# Patient Record
Sex: FEMALE | Race: WHITE
[De-identification: names, ages, dates, MRNs, and addresses within clinical notes are randomized per-mention and may not be internally consistent; named-entity substitution may affect disease eponyms.]

---

## 2017-08-14 ENCOUNTER — HOSPITAL ENCOUNTER (OUTPATIENT)
Dept: HOSPITAL 47 - RADMRIMAIN | Age: 52
Discharge: HOME | End: 2017-08-14
Payer: COMMERCIAL

## 2017-08-14 DIAGNOSIS — M47.22: ICD-10-CM

## 2017-08-14 DIAGNOSIS — M51.27: Primary | ICD-10-CM

## 2017-08-14 DIAGNOSIS — M43.12: ICD-10-CM

## 2017-08-14 PROCEDURE — 72148 MRI LUMBAR SPINE W/O DYE: CPT

## 2017-08-14 NOTE — MR
EXAMINATION TYPE: MR lumbar spine wo con

 

DATE OF EXAM: 8/14/2017

 

COMPARISON: NONE

 

HISTORY: LBP, numbness/tingling in left leg

 

TECHNIQUE: 

Multiplanar, multisequence images of the lumbar spine were acquired.

 

Lumbar vertebra have normal alignment. Disc spaces are fairly normal for age. There is no spinal sten
osis. Lumbar nerve roots appear normal. The neural foramina are fairly well-maintained. There is a sm
all posterior central L5-S1 disc herniation without significant compromise of the spinal canal. There
 are small sacral cysts at the S2 level. There is no paraspinal mass. There is no compression fractur
e. I see no focal bone destruction.

IMPRESSION:

There is a mild posterior central L5-S1 disc herniation without compromise of the neural elements. No
 fracture. No spinal stenosis.

## 2018-01-14 ENCOUNTER — HOSPITAL ENCOUNTER (INPATIENT)
Dept: HOSPITAL 47 - EC | Age: 53
LOS: 2 days | Discharge: HOME | DRG: 392 | End: 2018-01-16
Payer: COMMERCIAL

## 2018-01-14 VITALS — BODY MASS INDEX: 28.3 KG/M2

## 2018-01-14 DIAGNOSIS — K44.9: ICD-10-CM

## 2018-01-14 DIAGNOSIS — Z91.012: ICD-10-CM
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
## 2018-01-14 DIAGNOSIS — G89.29: ICD-10-CM

## 2018-01-14 DIAGNOSIS — M25.511: ICD-10-CM

## 2018-01-14 DIAGNOSIS — Z88.5: ICD-10-CM

## 2018-01-14 DIAGNOSIS — I27.20: ICD-10-CM

## 2018-01-14 DIAGNOSIS — S06.9X1A: ICD-10-CM

## 2018-01-14 DIAGNOSIS — I71.2: ICD-10-CM

## 2018-01-14 DIAGNOSIS — E86.0: ICD-10-CM

## 2018-01-14 DIAGNOSIS — R55: ICD-10-CM

## 2018-01-14 DIAGNOSIS — F32.9: ICD-10-CM

## 2018-01-14 DIAGNOSIS — E86.1: ICD-10-CM

## 2018-01-14 DIAGNOSIS — Z79.899: ICD-10-CM

## 2018-01-14 DIAGNOSIS — R40.2362: ICD-10-CM

## 2018-01-14 DIAGNOSIS — Z90.710: ICD-10-CM

## 2018-01-14 DIAGNOSIS — K21.9: ICD-10-CM

## 2018-01-14 DIAGNOSIS — Z91.011: ICD-10-CM

## 2018-01-14 DIAGNOSIS — A08.4: Primary | ICD-10-CM
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
## 2018-01-14 DIAGNOSIS — R40.2252: ICD-10-CM

## 2018-01-14 DIAGNOSIS — J45.909: ICD-10-CM

## 2018-01-14 DIAGNOSIS — Z88.2: ICD-10-CM

## 2018-01-14 DIAGNOSIS — Z79.51: ICD-10-CM

## 2018-01-14 DIAGNOSIS — E03.9: ICD-10-CM

## 2018-01-14 DIAGNOSIS — R40.2142: ICD-10-CM

## 2018-01-14 LAB
ALBUMIN SERPL-MCNC: 4.3 G/DL (ref 3.5–5)
ALP SERPL-CCNC: 63 U/L (ref 38–126)
ALT SERPL-CCNC: 32 U/L (ref 9–52)
ANION GAP SERPL CALC-SCNC: 10 MMOL/L
APTT BLD: 22.7 SEC (ref 22–30)
AST SERPL-CCNC: 24 U/L (ref 14–36)
BASOPHILS # BLD AUTO: 0.1 K/UL (ref 0–0.2)
BASOPHILS NFR BLD AUTO: 0 %
BUN SERPL-SCNC: 24 MG/DL (ref 7–17)
CALCIUM SPEC-MCNC: 9.5 MG/DL (ref 8.4–10.2)
CHLORIDE SERPL-SCNC: 104 MMOL/L (ref 98–107)
CK SERPL-CCNC: 114 U/L (ref 30–135)
CO2 SERPL-SCNC: 26 MMOL/L (ref 22–30)
D DIMER PPP FEU-MCNC: 2.96 MG/L FEU (ref ?–0.6)
EOSINOPHIL # BLD AUTO: 0.1 K/UL (ref 0–0.7)
EOSINOPHIL NFR BLD AUTO: 1 %
ERYTHROCYTE [DISTWIDTH] IN BLOOD BY AUTOMATED COUNT: 5.04 M/UL (ref 3.8–5.4)
ERYTHROCYTE [DISTWIDTH] IN BLOOD: 13.8 % (ref 11.5–15.5)
GLUCOSE SERPL-MCNC: 117 MG/DL (ref 74–99)
HCT VFR BLD AUTO: 50.5 % (ref 34–46)
HGB BLD-MCNC: 15.4 GM/DL (ref 11.4–16)
INR PPP: 1 (ref ?–1.2)
LYMPHOCYTES # SPEC AUTO: 0.6 K/UL (ref 1–4.8)
LYMPHOCYTES NFR SPEC AUTO: 3 %
MAGNESIUM SPEC-SCNC: 2 MG/DL (ref 1.6–2.3)
MCH RBC QN AUTO: 30.5 PG (ref 25–35)
MCHC RBC AUTO-ENTMCNC: 30.5 G/DL (ref 31–37)
MCV RBC AUTO: 100.2 FL (ref 80–100)
MONOCYTES # BLD AUTO: 0.7 K/UL (ref 0–1)
MONOCYTES NFR BLD AUTO: 4 %
NEUTROPHILS # BLD AUTO: 18.8 K/UL (ref 1.3–7.7)
NEUTROPHILS NFR BLD AUTO: 93 %
PLATELET # BLD AUTO: 343 K/UL (ref 150–450)
POTASSIUM SERPL-SCNC: 3.9 MMOL/L (ref 3.5–5.1)
PROT SERPL-MCNC: 6.8 G/DL (ref 6.3–8.2)
PT BLD: 9.6 SEC (ref 9–12)
SODIUM SERPL-SCNC: 140 MMOL/L (ref 137–145)
TROPONIN I SERPL-MCNC: <0.012 NG/ML (ref 0–0.03)
WBC # BLD AUTO: 20.3 K/UL (ref 3.8–10.6)

## 2018-01-14 PROCEDURE — 87040 BLOOD CULTURE FOR BACTERIA: CPT

## 2018-01-14 PROCEDURE — 70450 CT HEAD/BRAIN W/O DYE: CPT

## 2018-01-14 PROCEDURE — 85610 PROTHROMBIN TIME: CPT

## 2018-01-14 PROCEDURE — 85730 THROMBOPLASTIN TIME PARTIAL: CPT

## 2018-01-14 PROCEDURE — 93306 TTE W/DOPPLER COMPLETE: CPT

## 2018-01-14 PROCEDURE — 87086 URINE CULTURE/COLONY COUNT: CPT

## 2018-01-14 PROCEDURE — 87502 INFLUENZA DNA AMP PROBE: CPT

## 2018-01-14 PROCEDURE — 71046 X-RAY EXAM CHEST 2 VIEWS: CPT

## 2018-01-14 PROCEDURE — 72125 CT NECK SPINE W/O DYE: CPT

## 2018-01-14 PROCEDURE — 36415 COLL VENOUS BLD VENIPUNCTURE: CPT

## 2018-01-14 PROCEDURE — 96361 HYDRATE IV INFUSION ADD-ON: CPT

## 2018-01-14 PROCEDURE — 84484 ASSAY OF TROPONIN QUANT: CPT

## 2018-01-14 PROCEDURE — 96375 TX/PRO/DX INJ NEW DRUG ADDON: CPT

## 2018-01-14 PROCEDURE — 96374 THER/PROPH/DIAG INJ IV PUSH: CPT

## 2018-01-14 PROCEDURE — 83605 ASSAY OF LACTIC ACID: CPT

## 2018-01-14 PROCEDURE — 80048 BASIC METABOLIC PNL TOTAL CA: CPT

## 2018-01-14 PROCEDURE — 94640 AIRWAY INHALATION TREATMENT: CPT

## 2018-01-14 PROCEDURE — 83735 ASSAY OF MAGNESIUM: CPT

## 2018-01-14 PROCEDURE — 82550 ASSAY OF CK (CPK): CPT

## 2018-01-14 PROCEDURE — 82553 CREATINE MB FRACTION: CPT

## 2018-01-14 PROCEDURE — 85025 COMPLETE CBC W/AUTO DIFF WBC: CPT

## 2018-01-14 PROCEDURE — 94760 N-INVAS EAR/PLS OXIMETRY 1: CPT

## 2018-01-14 PROCEDURE — 71275 CT ANGIOGRAPHY CHEST: CPT

## 2018-01-14 PROCEDURE — 87324 CLOSTRIDIUM AG IA: CPT

## 2018-01-14 PROCEDURE — 85379 FIBRIN DEGRADATION QUANT: CPT

## 2018-01-14 PROCEDURE — 93005 ELECTROCARDIOGRAM TRACING: CPT

## 2018-01-14 PROCEDURE — 80053 COMPREHEN METABOLIC PANEL: CPT

## 2018-01-14 PROCEDURE — 81001 URINALYSIS AUTO W/SCOPE: CPT

## 2018-01-14 PROCEDURE — 99285 EMERGENCY DEPT VISIT HI MDM: CPT

## 2018-01-14 NOTE — CT
EXAMINATION TYPE: CT brain cspine wo con

 

DATE OF EXAM: 1/14/2018

 

COMPARISON: NONE

 

HISTORY: 1495.40

Head pain. Neck pain.

CT DLP: syncope mGycm

Automated exposure control for dose reduction was used.

 

TECHNIQUE: CT scan of the head and cervical spine are performed without contrast.

 

FINDINGS:   Ventricles appear normal. There is no mass effect nor midline shift. There is no sign of 
intracranial hemorrhage. The calvarium appears intact. There is some mucosal thickening in the maxill
carlos sinuses.

 

Cervical vertebra have normal alignment. Skull base is intact. There is moderate spondylosis at C6-7 
with spurring of the endplates. Posterior elements are intact. There is no evidence for fracture.

 

IMPRESSION:

Negative CT scan of the brain. Maxillary sinusitis.

 

Moderate spondylosis at C6-7. No fracture.

## 2018-01-14 NOTE — ED
General Adult HPI





- General


Chief complaint: Syncope


Stated complaint: syncope


Time Seen by Provider: 01/14/18 22:32


Source: patient, RN notes reviewed, old records reviewed


Mode of arrival: wheelchair


Limitations: no limitations





- History of Present Illness


Initial comments: 





52-year-old female presents for evaluation of nausea vomiting diarrhea and 

syncopal episode.  Patient states that earlier in the day she was feeling fine.

  Had some right shoulder pain which is chronic.  She took a anti-inflammatory 

medication, shortly thereafter patient developed some diarrhea, she states this 

was watery and she had several episodes of significant volume.  She then 

developed some nausea and vomiting.  Wall vomiting the patient did have a 

syncopal episode, fell striking the back of her head on the toilet.  She was 

only unconscious momentarily.  Chief complaint at the time my evaluation his 

right shoulder pain and mild right anterior chest pain.  Denies any central 

chest pain.  Denies abdominal pain.  Patient still has persistent nausea and 

lightheadedness.  Denies dysuria.  Denies focal weakness.  





- Related Data


 Home Medications











 Medication  Instructions  Recorded  Confirmed


 


Albuterol Inhaler [Ventolin Hfa 1 - 2 puff INHALATION RT-Q6H PRN 01/14/18 01/14/ 18





Inhaler]   


 


Escitalopram [Lexapro] 5 mg PO DAILY 01/14/18 01/14/18


 


Estrogens, Conjugated Cream 1 applicator VAGINAL DAILY 01/14/18 01/14/18





[Premarin Cream]   


 


Fluticasone/Salmeterol [Advair 1 puff INHALATION RT-BID 01/14/18 01/14/18





250-50 Diskus]   


 


Omeprazole (Unknown Dose) 1 tab PO DAILY 01/14/18 01/14/18


 


Unknown Thyroid Medication 1 tab PO DAILY 01/14/18 01/14/18


 


acetaZOLAMIDE [Diamox] 125 mg PO DAILY 01/14/18 01/14/18











 Allergies











Allergy/AdvReac Type Severity Reaction Status Date / Time


 


codeine Allergy  Unknown Verified 01/14/18 22:51


 


morphine Allergy  Unknown Verified 01/14/18 22:51


 


Sulfa (Sulfonamide Allergy  Unknown Verified 01/14/18 22:51





Antibiotics)     














Review of Systems


ROS Statement: 


Those systems with pertinent positive or pertinent negative responses have been 

documented in the HPI.





ROS Other: All systems not noted in ROS Statement are negative.





Past Medical History


Past Medical History: Thyroid Disorder


History of Any Multi-Drug Resistant Organisms: None Reported


Past Surgical History: Hysterectomy


Past Psychological History: Depression


Smoking Status: Never smoker


Past Alcohol Use History: Daily


Past Drug Use History: None Reported





General Exam


Limitations: no limitations


General appearance: alert, in no apparent distress


Head exam: Present: atraumatic, normocephalic


Eye exam: Present: normal appearance, PERRL


ENT exam: Present: mucous membranes dry


Neck exam: Present: normal inspection.  Absent: tenderness, meningismus


Respiratory exam: Present: normal lung sounds bilaterally.  Absent: respiratory 

distress, wheezes


Cardiovascular Exam: Present: regular rate, normal rhythm


GI/Abdominal exam: Present: soft.  Absent: distended, tenderness, guarding


Extremities exam: Present: normal inspection, normal capillary refill.  Absent: 

pedal edema


Neurological exam: Present: alert, oriented X3, CN II-XII intact.  Absent: 

motor sensory deficit


Psychiatric exam: Present: normal affect, normal mood


Skin exam: Present: warm, dry, intact, diaphoretic.  Absent: cyanosis





Course


 Vital Signs











  01/14/18 01/14/18 01/14/18





  22:23 22:47 23:01


 


Temperature 98.5 F  


 


Pulse Rate 83  


 


Pulse Rate [  80 79





Cardiac Monitor   





]   


 


Respiratory 18  20





Rate   


 


Blood Pressure 95/52  


 


Blood Pressure   110/68





[Right Arm   





Sitting]   


 


Blood Pressure   118/71





[Right Arm   





Supine]   


 


O2 Sat by Pulse 99  





Oximetry   














  01/14/18





  23:42


 


Temperature 


 


Pulse Rate 88


 


Pulse Rate [ 





Cardiac Monitor 





] 


 


Respiratory 20





Rate 


 


Blood Pressure 130/81


 


Blood Pressure 





[Right Arm 





Sitting] 


 


Blood Pressure 





[Right Arm 





Supine] 


 


O2 Sat by Pulse 98





Oximetry 














EKG Findings





- EKG Comments:


EKG Findings:: EKG shows normal sinus rhythm, ventricular rate 84, para over 118

, QRS duration 86, .  No signs of ST segment elevation or depression





Medical Decision Making





- Medical Decision Making





52-year-old female presenting with vomiting diarrhea and syncopal episode.  

Patient did have head trauma.  CT the head is obtained, is negative for 

intracranial hemorrhage, CT cervical spine negative for fracture or 

subluxation.  Chest x-ray obtained is negative for focal pneumonia or acute 

intrathoracic process.  Laboratory studies reveal white blood cell count 20.3, 

hemoglobin stable 15.4, d-dimer is elevated at 2.96, and CT angiography will be 

obtained.  Electrolytes within normal limits, troponin negative.


Patient is symptomatic with orthostatic vital signs.  Regarding elevated white 

blood cell count, blood culture and urine culture, this may be reactive 

secondary to vomiting.





UA pending.





Influenza is pending





CT angiography is ordered for syncopal episode with elevated d-dimer, this is 

negative for pulmonary embolism or dissection, there is incidental finding of 

4.3 cm ascending aortic aneurysm.  





Echo will be obtained.








Patient will be observed, continued on IV hydration, repeat laboratory studies 

will be obtained in the morning.





- Lab Data


Result diagrams: 


 01/14/18 22:45





 01/14/18 22:45


 Lab Results











  01/14/18 01/14/18 01/14/18 Range/Units





  22:45 22:45 22:45 


 


WBC   20.3 H   (3.8-10.6)  k/uL


 


RBC   5.04   (3.80-5.40)  m/uL


 


Hgb   15.4   (11.4-16.0)  gm/dL


 


Hct   50.5 H   (34.0-46.0)  %


 


MCV   100.2 H   (80.0-100.0)  fL


 


MCH   30.5   (25.0-35.0)  pg


 


MCHC   30.5 L   (31.0-37.0)  g/dL


 


RDW   13.8   (11.5-15.5)  %


 


Plt Count   343   (150-450)  k/uL


 


Neutrophils %   93   %


 


Lymphocytes %   3   %


 


Monocytes %   4   %


 


Eosinophils %   1   %


 


Basophils %   0   %


 


Neutrophils #   18.8 H   (1.3-7.7)  k/uL


 


Lymphocytes #   0.6 L   (1.0-4.8)  k/uL


 


Monocytes #   0.7   (0-1.0)  k/uL


 


Eosinophils #   0.1   (0-0.7)  k/uL


 


Basophils #   0.1   (0-0.2)  k/uL


 


PT     (9.0-12.0)  sec


 


INR     (<1.2)  


 


APTT     (22.0-30.0)  sec


 


D-Dimer     (<0.60)  mg/L FEU


 


Sodium    140  (137-145)  mmol/L


 


Potassium    3.9  (3.5-5.1)  mmol/L


 


Chloride    104  ()  mmol/L


 


Carbon Dioxide    26  (22-30)  mmol/L


 


Anion Gap    10  mmol/L


 


BUN    24 H  (7-17)  mg/dL


 


Creatinine    1.00  (0.52-1.04)  mg/dL


 


Est GFR (MDRD) Af Amer    >60  (>60 ml/min/1.73 sqM)  


 


Est GFR (MDRD) Non-Af    58  (>60 ml/min/1.73 sqM)  


 


Glucose    117 H  (74-99)  mg/dL


 


Calcium    9.5  (8.4-10.2)  mg/dL


 


Magnesium    2.0  (1.6-2.3)  mg/dL


 


Total Bilirubin    0.5  (0.2-1.3)  mg/dL


 


AST    24  (14-36)  U/L


 


ALT    32  (9-52)  U/L


 


Alkaline Phosphatase    63  ()  U/L


 


Total Creatine Kinase  114    ()  U/L


 


CK-MB (CK-2)  2.2    (0.0-2.4)  ng/mL


 


CK-MB (CK-2) Rel Index  1.9    


 


Troponin I  <0.012    (0.000-0.034)  ng/mL


 


Total Protein    6.8  (6.3-8.2)  g/dL


 


Albumin    4.3  (3.5-5.0)  g/dL














  01/14/18 Range/Units





  22:45 


 


WBC   (3.8-10.6)  k/uL


 


RBC   (3.80-5.40)  m/uL


 


Hgb   (11.4-16.0)  gm/dL


 


Hct   (34.0-46.0)  %


 


MCV   (80.0-100.0)  fL


 


MCH   (25.0-35.0)  pg


 


MCHC   (31.0-37.0)  g/dL


 


RDW   (11.5-15.5)  %


 


Plt Count   (150-450)  k/uL


 


Neutrophils %   %


 


Lymphocytes %   %


 


Monocytes %   %


 


Eosinophils %   %


 


Basophils %   %


 


Neutrophils #   (1.3-7.7)  k/uL


 


Lymphocytes #   (1.0-4.8)  k/uL


 


Monocytes #   (0-1.0)  k/uL


 


Eosinophils #   (0-0.7)  k/uL


 


Basophils #   (0-0.2)  k/uL


 


PT  9.6  (9.0-12.0)  sec


 


INR  1.0  (<1.2)  


 


APTT  22.7  (22.0-30.0)  sec


 


D-Dimer  2.96 H  (<0.60)  mg/L FEU


 


Sodium   (137-145)  mmol/L


 


Potassium   (3.5-5.1)  mmol/L


 


Chloride   ()  mmol/L


 


Carbon Dioxide   (22-30)  mmol/L


 


Anion Gap   mmol/L


 


BUN   (7-17)  mg/dL


 


Creatinine   (0.52-1.04)  mg/dL


 


Est GFR (MDRD) Af Amer   (>60 ml/min/1.73 sqM)  


 


Est GFR (MDRD) Non-Af   (>60 ml/min/1.73 sqM)  


 


Glucose   (74-99)  mg/dL


 


Calcium   (8.4-10.2)  mg/dL


 


Magnesium   (1.6-2.3)  mg/dL


 


Total Bilirubin   (0.2-1.3)  mg/dL


 


AST   (14-36)  U/L


 


ALT   (9-52)  U/L


 


Alkaline Phosphatase   ()  U/L


 


Total Creatine Kinase   ()  U/L


 


CK-MB (CK-2)   (0.0-2.4)  ng/mL


 


CK-MB (CK-2) Rel Index   


 


Troponin I   (0.000-0.034)  ng/mL


 


Total Protein   (6.3-8.2)  g/dL


 


Albumin   (3.5-5.0)  g/dL














Disposition


Clinical Impression: 


 Syncope due to orthostatic hypotension, Vomiting and diarrhea





Disposition: ADMITTED AS IP TO THIS [facility]


Condition: Stable


Referrals: 


Boris Michael DO [Primary Care Provider] - 1-2 days


Decision to Admit Reason: Admit from EC


Decision Date: 01/15/18


Decision Time: 00:29

## 2018-01-15 LAB
ANION GAP SERPL CALC-SCNC: 10 MMOL/L
BASOPHILS # BLD AUTO: 0 K/UL (ref 0–0.2)
BASOPHILS NFR BLD AUTO: 0 %
BUN SERPL-SCNC: 24 MG/DL (ref 7–17)
CALCIUM SPEC-MCNC: 8.6 MG/DL (ref 8.4–10.2)
CHLORIDE SERPL-SCNC: 107 MMOL/L (ref 98–107)
CO2 SERPL-SCNC: 23 MMOL/L (ref 22–30)
EOSINOPHIL # BLD AUTO: 0 K/UL (ref 0–0.7)
EOSINOPHIL NFR BLD AUTO: 0 %
ERYTHROCYTE [DISTWIDTH] IN BLOOD BY AUTOMATED COUNT: 4.3 M/UL (ref 3.8–5.4)
ERYTHROCYTE [DISTWIDTH] IN BLOOD: 13.4 % (ref 11.5–15.5)
GLUCOSE SERPL-MCNC: 153 MG/DL (ref 74–99)
HCT VFR BLD AUTO: 42.4 % (ref 34–46)
HGB BLD-MCNC: 13.3 GM/DL (ref 11.4–16)
KETONES UR QL STRIP.AUTO: (no result)
LYMPHOCYTES # SPEC AUTO: 0.2 K/UL (ref 1–4.8)
LYMPHOCYTES NFR SPEC AUTO: 1 %
MCH RBC QN AUTO: 30.9 PG (ref 25–35)
MCHC RBC AUTO-ENTMCNC: 31.3 G/DL (ref 31–37)
MCV RBC AUTO: 98.6 FL (ref 80–100)
MONOCYTES # BLD AUTO: 0.2 K/UL (ref 0–1)
MONOCYTES NFR BLD AUTO: 2 %
NEUTROPHILS # BLD AUTO: 14.3 K/UL (ref 1.3–7.7)
NEUTROPHILS NFR BLD AUTO: 97 %
PH UR: 6 [PH] (ref 5–8)
PLATELET # BLD AUTO: 258 K/UL (ref 150–450)
POTASSIUM SERPL-SCNC: 4.1 MMOL/L (ref 3.5–5.1)
PROT UR QL: (no result)
RBC UR QL: 1 /HPF (ref 0–5)
SODIUM SERPL-SCNC: 140 MMOL/L (ref 137–145)
SP GR UR: >1.05 (ref 1–1.03)
SQUAMOUS UR QL AUTO: 2 /HPF (ref 0–4)
UROBILINOGEN UR QL STRIP: <2 MG/DL (ref ?–2)
WBC # BLD AUTO: 14.8 K/UL (ref 3.8–10.6)
WBC #/AREA URNS HPF: <1 /HPF (ref 0–5)

## 2018-01-15 RX ADMIN — CEFAZOLIN SCH MLS/HR: 330 INJECTION, POWDER, FOR SOLUTION INTRAMUSCULAR; INTRAVENOUS at 12:19

## 2018-01-15 RX ADMIN — LEVOTHYROXINE SODIUM SCH MCG: 50 TABLET ORAL at 12:18

## 2018-01-15 RX ADMIN — CEFAZOLIN SCH MLS/HR: 330 INJECTION, POWDER, FOR SOLUTION INTRAMUSCULAR; INTRAVENOUS at 01:25

## 2018-01-15 RX ADMIN — ESCITALOPRAM SCH MG: 5 TABLET, FILM COATED ORAL at 08:04

## 2018-01-15 RX ADMIN — BUDESONIDE AND FORMOTEROL FUMARATE DIHYDRATE SCH PUFF: 80; 4.5 AEROSOL RESPIRATORY (INHALATION) at 19:38

## 2018-01-15 RX ADMIN — BUDESONIDE AND FORMOTEROL FUMARATE DIHYDRATE SCH PUFF: 80; 4.5 AEROSOL RESPIRATORY (INHALATION) at 08:32

## 2018-01-15 RX ADMIN — PANTOPRAZOLE SODIUM SCH MG: 40 TABLET, DELAYED RELEASE ORAL at 07:45

## 2018-01-15 RX ADMIN — KETOROLAC TROMETHAMINE PRN MG: 30 INJECTION, SOLUTION INTRAMUSCULAR at 14:12

## 2018-01-15 RX ADMIN — KETOROLAC TROMETHAMINE PRN MG: 30 INJECTION, SOLUTION INTRAMUSCULAR at 23:03

## 2018-01-15 NOTE — XR
EXAMINATION TYPE: XR chest 2V

 

DATE OF EXAM: 1/14/2018

 

COMPARISON: NONE

 

HISTORY: Syncope

 

TECHNIQUE:  Frontal and lateral views of the chest are obtained.

 

FINDINGS:  Heart and mediastinum are normal. Lungs are clear of infiltrate. There is hiatal hernia. T
here is no sign of pleural effusion. There is mild spurring in the thoracic spine.

 

IMPRESSION:  No active cardiopulmonary disease.

## 2018-01-15 NOTE — ECHOF
Referral Reason:syncope



MEASUREMENTS

--------

HEIGHT: 177.8 cm

WEIGHT: 81.6 kg

BP: 124/69

RVIDd:   3.2 cm     (< 3.3)

IVSd:   1.0 cm     (0.6 - 1.1)

LVIDd:   4.2 cm     (3.9 - 5.3)

LVPWd:   1.1 cm     (0.6 - 1.1)

IVSs:   1.6 cm

LVIDs:   2.0 cm

LVPWs:   1.6 cm

LAESV Index (A-L):   16.67 ml/m

Ao Diam:   2.9 cm     (2.0 - 3.7)

AV Cusp:   1.7 cm     (1.5 - 2.6)

LA Diam:   3.7 cm     (2.7 - 3.8)

EPSS:   0.4 cm

MV E Ryan:   1.04 m/s

MV DecT:   309 ms

MV A Ryan:   1.21 m/s

MV E/A Ratio:   0.85 

RAP:   5.00 mmHg

RVSP:   38.25 mmHg

MV EF SLOPE:   76.09 mm/s     (70 - 150)

MV EXCURSION:   1.76 cm     (> 18.000)







FINDINGS

--------

Sinus rhythm.

This was a technically good study.

The left ventricular size is normal.   Left ventricular wall thickness is normal.   Overall left vent
ricular systolic function is normal with, an EF between 65 - 70 %.

The right ventricle is normal in size and function.

Normal LA  size by volume 22+/-6 ml/m2.

The right atrium is normal in size.

Aortic valve is trileaflet and is mildly thickened.   There is no evidence of aortic regurgitation.  
 There is no evidence of aortic stenosis.

The mitral valve leaflets are mildly thickened.   There is trace to mild mitral regurgitation.

Mild tricuspid regurgitation present.   There is mild pulmonary hypertension.   The right ventricular
 systolic pressure, as measured by Doppler, is 38.25mmHg.

The pulmonic valve was not well visualized.

The aortic root size is normal.

Normal inferior vena cava with normal inspiratory collapse consistent with estimated right atrial pre
ssure of  5 mmHg.

The pericardium is normal.   There is no pericardial effusion.



CONCLUSIONS

--------

1. Sinus rhythm.

2. This was a technically good study.

3. The left ventricular size is normal.

4. Left ventricular wall thickness is normal.

5. Overall left ventricular systolic function is normal with, an EF between 65 - 70 %.

6. Normal LA size by volume 22+/-6 ml/m2.

7. Aortic valve is trileaflet and is mildly thickened.

8. The mitral valve leaflets are mildly thickened.

9. There is trace to mild mitral regurgitation.

10. Mild tricuspid regurgitation present.

11. There is mild pulmonary hypertension.

12. The right ventricular systolic pressure, as measured by Doppler, is 38.25mmHg.

13. The pulmonic valve was not well visualized.

14. The aortic root size is normal.

15. There is no pericardial effusion.





SONOGRAPHER: Chuck Dean RDCS

## 2018-01-15 NOTE — CT
EXAMINATION TYPE: CT angio chest

 

DATE OF EXAM: 1/15/2018 12:20 AM

 

COMPARISON: NONE

 

HISTORY: R/O PE

 

CT DLP: 291.50 mGycm

Automated exposure control for dose reduction was used.

 

CONTRAST: 

CTA scan of the thorax is performed with IV Contrast, patient injected with 70 mL of Visipaque 320, p
ulmonary embolism protocol.  There are 3-D post processed images..  

 

FINDINGS:

 

The lungs are clear of infiltrate. There is no evidence of a pulmonary mass. There is moderate hiatal
 hernia noted. There is aneurysm of ascending aorta measures 4.3 cm. There is no sign of dissection. 
I see no filling defects in the pulmonary arteries. There is no mediastinal adenopathy. There are no 
hilar masses. There is no pleural effusion. There is spurring in the thoracic spine.

 

 

IMPRESSION: 

NO EVIDENCE OF PULMONARY EMBOLISM.

 

ANEURYSM OF ASCENDING AORTA. HIATAL HERNIA.

## 2018-01-15 NOTE — P.GSCN
<JackLetaSanta M - Last Filed: 01/15/18 14:57>





History of Present Illness


Consult date: 01/15/18


Reason for Consult: 





Emesis


History of present illness: 





52-year-old female being seen at the request of the attending for a surgical 

eval in a patient who developed a sudden onset of nausea vomiting diarrhea with 

a syncopal episode.  Patient states that she took an over-the-counter anti-

inflammatory medication for chronic right shoulder pain   shortly after taking 

it developed watery stools felt nauseated started vomiting.    stated she had 

several episodes.  Subsequently was admitted to the services of the attending.  

On current exam  states since being admitted has not had any further episodes 

of nausea vomiting taking a full diet and has not had any diarrhea.





Computed tomography scan of the chest showed no evidence of a pulmonary emboli.

  It did show ascending aortic aneurysm 4.3.  Moderate hiatal hernia noted 

echocardiogram left ventricular systolic function normal EF between 65 and 70% 

mild pulmonary hypertension  computed tomography scan of the cervical spine 

negative for fracture  negative CT of the brain





Review of Systems





Essentially unremarkable except as mentioned in the present illness





Past Medical History


Past Medical History: Asthma, Thyroid Disorder


Additional Past Medical History / Comment(s): Hypothyroid


History of Any Multi-Drug Resistant Organisms: None Reported


Past Surgical History: Hysterectomy


Smoking Status: Never smoker





Medications and Allergies


 Home Medications











 Medication  Instructions  Recorded  Confirmed  Type


 


Albuterol Inhaler [Ventolin Hfa 1 - 2 puff INHALATION RT-Q6H PRN 01/14/18 01/14/ 18 History





Inhaler]    


 


Escitalopram [Lexapro] 5 mg PO DAILY 01/14/18 01/14/18 History


 


Estrogens, Conjugated Cream 1 applicator VAGINAL DAILY 01/14/18 01/14/18 History





[Premarin Cream]    


 


Fluticasone/Salmeterol [Advair 1 puff INHALATION RT-BID 01/14/18 01/14/18 

History





250-50 Diskus]    


 


acetaZOLAMIDE [Diamox] 125 mg PO DAILY 01/14/18 01/14/18 History


 


Levothyroxine Sodium [Synthroid] 50 mcg PO DAILY 01/15/18 01/15/18 History


 


Omeprazole [PriLOSEC] 20 mg PO AC-[id number] 01/15/18 01/15/18 History











 Allergies











Allergy/AdvReac Type Severity Reaction Status Date / Time


 


egg Allergy  Abdominal Verified 01/15/18 02:50





   Pain  


 


Milk Containing Products Allergy  Abdominal Verified 01/15/18 02:50





[Dairy]   Pain  


 


Sulfa (Sulfonamide Allergy  Unknown Verified 01/15/18 02:50





Antibiotics)     


 


codeine AdvReac  Hallucinati Verified 01/15/18 01:57





   ons  


 


morphine AdvReac  Hallucinati Verified 01/15/18 01:57





   ons  














Surgical - Exam


 Vital Signs











Temp Pulse Resp BP Pulse Ox


 


 98.5 F   83   18   95/52   99 


 


 01/14/18 22:23  01/14/18 22:23  01/14/18 22:23  01/14/18 22:23  01/14/18 22:23














GENERAL APPEARANCE:  patient is alert, oriented x 3 , in no acute distress.  

Resting in bed


VITAL SIGNS: Reviewed


HEENT: Head is normocephalic and atraumatic. Pupils are equal and reactive. The 

nares are patent. Oropharynx is clear without lesions.


NECK: Supple without lymphadenopathy. Traches midline.


HEART: S1, S2. Regular rate and rhythm.  No murmur noted


LUNGS: No crackles or wheezes are heard.  Adequate air movement bilaterally


ABDOMEN: Soft, nontender, nondistended with good bowel sounds. No peritoneal 

signs. No palpable organomegaly or masses currently denying any abdominal pain 

states no nausea vomiting currently eating potato skins.  States has not had a 

bowel movement since admission no abdominal pain


EXTREMITIES: Normal skin color and turgor. No cyanosis, rash, ulceration, 

clubbing or edema. Radial pedal pulses are 2/4 bilaterally.


NEUROLOGICAL: No focal deficits. Strength and sensation are grossly intact.








Results





- Labs





 01/15/18 08:02





 01/15/18 08:02


 Abnormal Lab Results - Last 24 Hours (Table)











  01/14/18 01/14/18 01/14/18 Range/Units





  22:45 22:45 22:45 


 


WBC  20.3 H    (3.8-10.6)  k/uL


 


Hct  50.5 H    (34.0-46.0)  %


 


MCV  100.2 H    (80.0-100.0)  fL


 


MCHC  30.5 L    (31.0-37.0)  g/dL


 


Neutrophils #  18.8 H    (1.3-7.7)  k/uL


 


Lymphocytes #  0.6 L    (1.0-4.8)  k/uL


 


D-Dimer    2.96 H  (<0.60)  mg/L FEU


 


BUN   24 H   (7-17)  mg/dL


 


Glucose   117 H   (74-99)  mg/dL


 


Ur Specific Gravity     (1.001-1.035)  


 


Urine Protein     (Negative)  


 


Urine Ketones     (Negative)  


 


Urine Mucus     (None)  /hpf














  01/15/18 01/15/18 01/15/18 Range/Units





  00:29 08:02 08:02 


 


WBC   14.8 H   (3.8-10.6)  k/uL


 


Hct     (34.0-46.0)  %


 


MCV     (80.0-100.0)  fL


 


MCHC     (31.0-37.0)  g/dL


 


Neutrophils #   14.3 H   (1.3-7.7)  k/uL


 


Lymphocytes #   0.2 L   (1.0-4.8)  k/uL


 


D-Dimer     (<0.60)  mg/L FEU


 


BUN    24 H  (7-17)  mg/dL


 


Glucose    153 H  (74-99)  mg/dL


 


Ur Specific Gravity  >1.050 H    (1.001-1.035)  


 


Urine Protein  2+ H    (Negative)  


 


Urine Ketones  1+ H    (Negative)  


 


Urine Mucus  Occasional H    (None)  /hpf








 Microbiology - Last 24 Hours (Table)











 01/15/18 00:29 Urine Culture - Preliminary





 Urine,Voided 








 Diabetes panel











  01/14/18 01/15/18 Range/Units





  22:45 08:02 


 


Sodium  140  140  (137-145)  mmol/L


 


Potassium  3.9  4.1  (3.5-5.1)  mmol/L


 


Chloride  104  107  ()  mmol/L


 


Carbon Dioxide  26  23  (22-30)  mmol/L


 


BUN  24 H  24 H  (7-17)  mg/dL


 


Creatinine  1.00  0.72  (0.52-1.04)  mg/dL


 


Glucose  117 H  153 H  (74-99)  mg/dL


 


Calcium  9.5  8.6  (8.4-10.2)  mg/dL


 


AST  24   (14-36)  U/L


 


ALT  32   (9-52)  U/L


 


Alkaline Phosphatase  63   ()  U/L


 


Total Protein  6.8   (6.3-8.2)  g/dL


 


Albumin  4.3   (3.5-5.0)  g/dL








 Calcium panel











  01/14/18 01/15/18 Range/Units





  22:45 08:02 


 


Calcium  9.5  8.6  (8.4-10.2)  mg/dL


 


Albumin  4.3   (3.5-5.0)  g/dL








 Pituitary panel











  01/14/18 01/15/18 Range/Units





  22:45 08:02 


 


Sodium  140  140  (137-145)  mmol/L


 


Potassium  3.9  4.1  (3.5-5.1)  mmol/L


 


Chloride  104  107  ()  mmol/L


 


Carbon Dioxide  26  23  (22-30)  mmol/L


 


BUN  24 H  24 H  (7-17)  mg/dL


 


Creatinine  1.00  0.72  (0.52-1.04)  mg/dL


 


Glucose  117 H  153 H  (74-99)  mg/dL


 


Calcium  9.5  8.6  (8.4-10.2)  mg/dL








 Adrenal panel











  01/14/18 01/15/18 Range/Units





  22:45 08:02 


 


Sodium  140  140  (137-145)  mmol/L


 


Potassium  3.9  4.1  (3.5-5.1)  mmol/L


 


Chloride  104  107  ()  mmol/L


 


Carbon Dioxide  26  23  (22-30)  mmol/L


 


BUN  24 H  24 H  (7-17)  mg/dL


 


Creatinine  1.00  0.72  (0.52-1.04)  mg/dL


 


Glucose  117 H  153 H  (74-99)  mg/dL


 


Calcium  9.5  8.6  (8.4-10.2)  mg/dL


 


Total Bilirubin  0.5   (0.2-1.3)  mg/dL


 


AST  24   (14-36)  U/L


 


ALT  32   (9-52)  U/L


 


Alkaline Phosphatase  63   ()  U/L


 


Total Protein  6.8   (6.3-8.2)  g/dL


 


Albumin  4.3   (3.5-5.0)  g/dL














Assessment and Plan


Assessment: 





Impression


Present on admission nausea vomiting diarrhea likely due to gastroenteritis 

with stool negative for C. diff 


Present on admission Elevated d-dimer with a  CAT scan of the chest show no 

evidence of a pulmonary emboli


Echocardiogram preserved LV function EF 65-70%


Present on admission leukocytosis likely reactive


Presyncopal episode present on admission suspect due to hypovolemia due to 

dehydration poor oral intake








Plan


No evidence of acute surgical abdomen


IV fluid for hydration


Will follow with you 


Defer to the attending to address medical issues as they arise


DVT and GI prophylaxis





Consultation dictated for Dr. berrios





The above impression and plan of care have been discussed and directed by 

signing physician. Santa Santiago nurse practitioner acting as scribe for signing 

physician.





<Ivan Berrios - Last Filed: 01/15/18 15:28>





Surgical - Exam


 Vital Signs











Temp Pulse Resp BP Pulse Ox


 


 98.5 F   83   18   95/52   99 


 


 01/14/18 22:23  01/14/18 22:23  01/14/18 22:23  01/14/18 22:23  01/14/18 22:23














Results





- Labs





 01/15/18 08:02





 01/15/18 08:02


 Abnormal Lab Results - Last 24 Hours (Table)











  01/14/18 01/14/18 01/14/18 Range/Units





  22:45 22:45 22:45 


 


WBC  20.3 H    (3.8-10.6)  k/uL


 


Hct  50.5 H    (34.0-46.0)  %


 


MCV  100.2 H    (80.0-100.0)  fL


 


MCHC  30.5 L    (31.0-37.0)  g/dL


 


Neutrophils #  18.8 H    (1.3-7.7)  k/uL


 


Lymphocytes #  0.6 L    (1.0-4.8)  k/uL


 


D-Dimer    2.96 H  (<0.60)  mg/L FEU


 


BUN   24 H   (7-17)  mg/dL


 


Glucose   117 H   (74-99)  mg/dL


 


Ur Specific Gravity     (1.001-1.035)  


 


Urine Protein     (Negative)  


 


Urine Ketones     (Negative)  


 


Urine Mucus     (None)  /hpf














  01/15/18 01/15/18 01/15/18 Range/Units





  00:29 08:02 08:02 


 


WBC   14.8 H   (3.8-10.6)  k/uL


 


Hct     (34.0-46.0)  %


 


MCV     (80.0-100.0)  fL


 


MCHC     (31.0-37.0)  g/dL


 


Neutrophils #   14.3 H   (1.3-7.7)  k/uL


 


Lymphocytes #   0.2 L   (1.0-4.8)  k/uL


 


D-Dimer     (<0.60)  mg/L FEU


 


BUN    24 H  (7-17)  mg/dL


 


Glucose    153 H  (74-99)  mg/dL


 


Ur Specific Gravity  >1.050 H    (1.001-1.035)  


 


Urine Protein  2+ H    (Negative)  


 


Urine Ketones  1+ H    (Negative)  


 


Urine Mucus  Occasional H    (None)  /hpf








 Microbiology - Last 24 Hours (Table)











 01/15/18 00:29 Urine Culture - Preliminary





 Urine,Voided 








 Diabetes panel











  01/14/18 01/15/18 Range/Units





  22:45 08:02 


 


Sodium  140  140  (137-145)  mmol/L


 


Potassium  3.9  4.1  (3.5-5.1)  mmol/L


 


Chloride  104  107  ()  mmol/L


 


Carbon Dioxide  26  23  (22-30)  mmol/L


 


BUN  24 H  24 H  (7-17)  mg/dL


 


Creatinine  1.00  0.72  (0.52-1.04)  mg/dL


 


Glucose  117 H  153 H  (74-99)  mg/dL


 


Calcium  9.5  8.6  (8.4-10.2)  mg/dL


 


AST  24   (14-36)  U/L


 


ALT  32   (9-52)  U/L


 


Alkaline Phosphatase  63   ()  U/L


 


Total Protein  6.8   (6.3-8.2)  g/dL


 


Albumin  4.3   (3.5-5.0)  g/dL








 Calcium panel











  01/14/18 01/15/18 Range/Units





  22:45 08:02 


 


Calcium  9.5  8.6  (8.4-10.2)  mg/dL


 


Albumin  4.3   (3.5-5.0)  g/dL








 Pituitary panel











  01/14/18 01/15/18 Range/Units





  22:45 08:02 


 


Sodium  140  140  (137-145)  mmol/L


 


Potassium  3.9  4.1  (3.5-5.1)  mmol/L


 


Chloride  104  107  ()  mmol/L


 


Carbon Dioxide  26  23  (22-30)  mmol/L


 


BUN  24 H  24 H  (7-17)  mg/dL


 


Creatinine  1.00  0.72  (0.52-1.04)  mg/dL


 


Glucose  117 H  153 H  (74-99)  mg/dL


 


Calcium  9.5  8.6  (8.4-10.2)  mg/dL








 Adrenal panel











  01/14/18 01/15/18 Range/Units





  22:45 08:02 


 


Sodium  140  140  (137-145)  mmol/L


 


Potassium  3.9  4.1  (3.5-5.1)  mmol/L


 


Chloride  104  107  ()  mmol/L


 


Carbon Dioxide  26  23  (22-30)  mmol/L


 


BUN  24 H  24 H  (7-17)  mg/dL


 


Creatinine  1.00  0.72  (0.52-1.04)  mg/dL


 


Glucose  117 H  153 H  (74-99)  mg/dL


 


Calcium  9.5  8.6  (8.4-10.2)  mg/dL


 


Total Bilirubin  0.5   (0.2-1.3)  mg/dL


 


AST  24   (14-36)  U/L


 


ALT  32   (9-52)  U/L


 


Alkaline Phosphatase  63   ()  U/L


 


Total Protein  6.8   (6.3-8.2)  g/dL


 


Albumin  4.3   (3.5-5.0)  g/dL














Assessment and Plan


Plan: 





Patiently medically treated.  Patient has history of GERD with a hiatal hernia.





We will schedule for EGD and outpatient colonoscopy when stable.

## 2018-01-15 NOTE — HP
HISTORY AND PHYSICAL



CHIEF COMPLAINT:

52-year-old white female with syncope, nausea, vomiting, and she felt fine at home.

She had some right shoulder pain, chronic while she was sitting on the toilet.  She

developed some diarrhea and vomiting while coming off the toilet.  She is vomiting.

She had a syncopal episode, striking head on the toilet, unconscious momentarily.

Complaining of shoulder pain, mild anterior chest pain.



She did have some abdominal pain, nausea, positive lightheadedness.  Denies dysuria.

Frequent weakness.



HOME MEDICATIONS:

Advair Diskus, Premarin, Lexapro, Ventolin HFA, Synthroid, Diamox, omeprazole.



ALLERGIES:

CODEINE, MORPHINE, SULFA.



REVIEW OF SYSTEMS:

Fourteen point review of systems negative except for mentioned in HPI.



PAST MEDICAL HISTORY:

Hypothyroidism.



SURGICAL HISTORY:

Hysterectomy.



PSYCH HISTORY:

History of depression.



SOCIAL HISTORY:

Never smoker.  Daily alcohol.  No illicit drugs.



PHYSICAL EXAM:

Vital signs stable.  Afebrile.  Cardiovascular S1, S2.

LUNGS:  Clear.  GI soft.  Hematology negative Homans.  Psych fair mood affect.

NEUROLOGIC:  Alert and orient x3.  Vascular normal dorsalis pedis, posterior radial

pulse.

ABDOMEN:  Soft.



Blood pressure 90s -100s over 50s to 60s, temp 98.5, pulse 80s to 79, respiratory 18-

20.  EKG sinus rhythm.



ASSESSMENT:

1. A 52-year-old female with vomiting, diarrhea, syncopal episode.  CT of the head was

    negative.  CT of the neck was negative.  Chest x-ray is negative.  White count is

    20.3, hemoglobin is 15.4.  D-dimer is 2.96.  CT angiogram was negative for any

    pulmonary embolism.  She has a 4.3 cm ascending aortic aneurysm.

2. Leukocytosis of unclear etiology, progressive vomiting, possible viral syndrome.

    Infectious Disease consult.

3. Surgery for emesis.

4. Syncope.  We will monitor neurologically.





MMODL / IJN: [id number] / Job#: [id number]

## 2018-01-16 VITALS
RESPIRATION RATE: 18 BRPM | TEMPERATURE: 97.2 F | DIASTOLIC BLOOD PRESSURE: 84 MMHG | SYSTOLIC BLOOD PRESSURE: 133 MMHG | HEART RATE: 77 BPM

## 2018-01-16 LAB
ALBUMIN SERPL-MCNC: 2.7 G/DL (ref 3.5–5)
ALP SERPL-CCNC: 43 U/L (ref 38–126)
ALT SERPL-CCNC: 30 U/L (ref 9–52)
ANION GAP SERPL CALC-SCNC: 6 MMOL/L
AST SERPL-CCNC: 18 U/L (ref 14–36)
BASOPHILS # BLD AUTO: 0 K/UL (ref 0–0.2)
BASOPHILS NFR BLD AUTO: 0 %
BUN SERPL-SCNC: 16 MG/DL (ref 7–17)
CALCIUM SPEC-MCNC: 8 MG/DL (ref 8.4–10.2)
CHLORIDE SERPL-SCNC: 112 MMOL/L (ref 98–107)
CO2 SERPL-SCNC: 21 MMOL/L (ref 22–30)
EOSINOPHIL # BLD AUTO: 0 K/UL (ref 0–0.7)
EOSINOPHIL NFR BLD AUTO: 0 %
ERYTHROCYTE [DISTWIDTH] IN BLOOD BY AUTOMATED COUNT: 3.99 M/UL (ref 3.8–5.4)
ERYTHROCYTE [DISTWIDTH] IN BLOOD: 13.6 % (ref 11.5–15.5)
GLUCOSE SERPL-MCNC: 99 MG/DL (ref 74–99)
HCT VFR BLD AUTO: 38.7 % (ref 34–46)
HGB BLD-MCNC: 11.9 GM/DL (ref 11.4–16)
LYMPHOCYTES # SPEC AUTO: 0.8 K/UL (ref 1–4.8)
LYMPHOCYTES NFR SPEC AUTO: 13 %
MAGNESIUM SPEC-SCNC: 1.9 MG/DL (ref 1.6–2.3)
MCH RBC QN AUTO: 29.9 PG (ref 25–35)
MCHC RBC AUTO-ENTMCNC: 30.9 G/DL (ref 31–37)
MCV RBC AUTO: 97 FL (ref 80–100)
MONOCYTES # BLD AUTO: 0.4 K/UL (ref 0–1)
MONOCYTES NFR BLD AUTO: 6 %
NEUTROPHILS # BLD AUTO: 5.1 K/UL (ref 1.3–7.7)
NEUTROPHILS NFR BLD AUTO: 79 %
PLATELET # BLD AUTO: 238 K/UL (ref 150–450)
POTASSIUM SERPL-SCNC: 3.7 MMOL/L (ref 3.5–5.1)
PROT SERPL-MCNC: 4.9 G/DL (ref 6.3–8.2)
SODIUM SERPL-SCNC: 139 MMOL/L (ref 137–145)
WBC # BLD AUTO: 6.4 K/UL (ref 3.8–10.6)

## 2018-01-16 RX ADMIN — PANTOPRAZOLE SODIUM SCH MG: 40 TABLET, DELAYED RELEASE ORAL at 07:49

## 2018-01-16 RX ADMIN — CEFAZOLIN SCH MLS/HR: 330 INJECTION, POWDER, FOR SOLUTION INTRAMUSCULAR; INTRAVENOUS at 05:40

## 2018-01-16 RX ADMIN — LEVOTHYROXINE SODIUM SCH MCG: 50 TABLET ORAL at 06:32

## 2018-01-16 RX ADMIN — ESCITALOPRAM SCH MG: 5 TABLET, FILM COATED ORAL at 07:48

## 2018-01-16 RX ADMIN — BUDESONIDE AND FORMOTEROL FUMARATE DIHYDRATE SCH PUFF: 80; 4.5 AEROSOL RESPIRATORY (INHALATION) at 07:26

## 2018-01-16 NOTE — CONS
CONSULTATION



DATE OF SERVICE:

01/15/2018



REASON FOR CONSULTATION:

Leukocytosis.



HISTORY OF PRESENT ILLNESS:

The patient is a 52-year-old  female presenting to the ER last night 
with

chief complaints of nausea, vomiting, diarrhea and syncopal episode.  Apparently

yesterday in the morning, the patient was doing fine.  She did have some 
chronic right

shoulder pain for which the patient took anti-inflammatory medication after 
which the

patient started having diarrhea.  She did have significant loose stools 1 day 
frequency innumerable, subsequently

feeling nauseous and throwing up. While vomiting the patient did have a syncopal

episode.  The patient fell striking the back of her head.  She lost 
consciousness

momentarily.  Subsequently the patient was brought into the ER for further 
evaluation

of the same. The patient also complaining of some shortness of breath and hard 
to

breathe.  No high-grade fever.  On presentation in the ER, the patient was 
noticed to

have a white count of 20,000.  The patient did have a CT angiogram that was 
negative

for PE, did show hiatal hernia and cardiac aneurysm.  The patient did have 
stool for C.

difficile, which was negative.  Acute influenza was negative. ID was consulted 
for

further recommendation regarding antibiotic therapy.  The patient didn't 
receive the dose of

antibiotic and white count down to 14,000.



REVIEW OF SYSTEMS:

CONSTITUTIONAL: Positive for weakness, but no fever.

EYES: No complaint.

ENT: No complaint.

RESPIRATORY: As per HPI.

CARDIOVASCULAR:  No complaint.

GENITOURINARY: No complaint.

GASTROINTESTINAL:  As per HPI.

MUSCULOSKELETAL: No complaint.

INTEGUMENTARY:  No complaint.

PSYCHOLOGICAL: No complaint.

ENDOCRINE: No complaint.

NEUROLOGICAL: As per HPI.



PAST MEDICAL HISTORY:

Significant for depression and hypothyroidism.



PAST SURGICAL HISTORY:

Hysterectomy.



SOCIAL HISTORY:

No history of smoking.  Drinks daily.  No drug use.



FAMILY HISTORY:

No pertinent findings noticed.



ALLERGIES:

Allergies to MILK CONTAINING PRODUCTS, SULFA, CODEINE, MORPHINE.



MEDICATIONS:

The patient is currently on Tylenol, Diamox, Ventolin, Symbicort, Lexapro, 
Toradol,

Synthroid, Narcan, Zofran, Protonix. Did received Solu-Medrol as well.



PHYSICAL EXAMINATION:

On examination, her blood pressure 107/59 with a pulse of 83, temperature of 
97. She is

95% on room air.

General description is a middle-aged female, lying in bed, in no distress.  No

tachypnea or accessory muscle of respiration use.

HEENT examination shows no pallor or scleral icterus.  Oral mucous membrane is 
dry.  No pharyngeal erythema or thrush

NECK: Trachea central. No thyromegaly.

LUNGS:  Unlabored breathing, clear to auscultation, no wheeze or crackles.

HEART: S1, S2.  Regular rate and rhythm.  No murmur

ABDOMEN:  Soft.  There is no tenderness.  No guarding or rigidity. No 
organomegaly.

EXTREMITIES:  No edema of the feet.

SKIN EXAMINATION: No rash or mass palpable.

NEUROLOGICAL:  Patient is awake, alert, oriented x3. Mood and affect normal.



LABS:

Hemoglobin is 13.3, white count 14.8, admission white count was 20,000. BUN of 
24,

creatinine 1.0. Electrolytes have been normal.  Liver enzymes are normal.  
Lactic acid

was 1.4.  Cardiac exam has been negative.  Urine is negative.  Influenza A/B 
negative.

Stool for C. Difficile was negative.



DIAGNOSTIC IMPRESSION AND PLAN:

Patient with leukocytosis in a patient admitted to the hospital predominantly 
with

gastrointestinal  symptoms of diarrhea, nausea and vomiting.  She also has a low
-grade

fever could have been more likely a viral gastroenteritis.  Clinically doubt a

bacterial infection. The patient's abdomen was soft on clinical examination and 
white

count already showing a downward trend without use of any antibiotic therapy.



PLAN:

1. Recommend obtaining a stool culture.

2. Symptomatic treatment of her illnesses with IV fluid.

3. Depending upon the clinical response as well as cultures, will adjust her

    medications further if needed.

Thank you for this consultation.  Will follow this patient along with you.



MMODL / IJN: [id number] / Job#: [id number]

FARIDEH

## 2018-01-16 NOTE — DS
DISCHARGE SUMMARY



DISCHARGE MEDICATIONS:

1. Premarin 1 application vaginally daily.

2. Advair 250/50 one puff b.i.d.

3. Ventolin HFA 2 puffs q.4 hours p.r.n.

4. Diamox 125 mg daily.

5. Lexapro 5 mg daily.

6. Prilosec 20 mg daily.

7. Synthroid 50 mg daily.



CONDITION:

Stable.



PROGNOSIS:

Guarded.



Ambulate as tolerated.



Regular diet.



DIAGNOSES:

1. Viral gastroenteritis.

2. Vasovagal syncope.

3. Reactive leukocytosis secondary to gastroenteritis.

4. Asthma.

5. Depression.

6. Hypothyroidism.

7. Gastroesophageal reflux disease.



HOSPITAL COURSE OF EVENTS:

This is a white female who came in with syncope and nausea and vomiting.  Stool culture

was negative for any C difficile. Reactive leukocytosis improved to normal prior to

discharge.  Fluid rehydration, antinausea medicine was given.  Surgical clearance was

given.  Infectious disease clearance was given.  No significant findings were found.

CAT scan of the head and CT of the neck were negative.  She will be discharged home.

Follow up as outpatient.





MMODL / IJN: [id number] / Job#: [id number]